# Patient Record
(demographics unavailable — no encounter records)

---

## 2025-05-05 NOTE — HISTORY OF PRESENT ILLNESS
[de-identified] : Ms. Renee Baxter is a very pleasant 53-year-old, Malay speaking female with  utilized for translation, with a PMHx of HTN, hx of chronic lower back pain x 4 years, hx of chronic neck pain with upper extremity radiculopathy s/p C3-C6 ACDF with Dr. Smith ([facility]) on 9/21/23 who presents today for a follow up visit, recommended by her pain management provider after obtaining recent MRI Cervical Spine Non Con at [facility] for ongoing neck and lower back pain. Since her last visit with us, she reports worsening neck pain/stiffness as well as lower back pain which is worse with movement, and can make it difficult for her to get out of bed some days. She states that some days her pain is worse than others, her most bothersome symptom is neck pain/stiffness. She also experiences numbness and tingling of her bilateral hands and feet, which have been worsening over the last few months. She has a history of pre-diabetes as of 3 months ago and is not currently on any medications for sugar control. She reports diffuse nondermatomal numbness/tingling/ache of her bilateral upper extremities is also evident, denies howard shooting pain down her upper extremities. She denies any howard shooting pain down her lower extremities, but states that she feels a deep ache/tightness of the posterior bilateral thighs and buttock region.  She denies any howard issues with dexterity or fine motor skills, but states that she can sometimes drop objects secondary to bilateral hand numbness and tingling.  She denies any issues with bowel/bladder incontinence or saddle anesthesia. Per chart review, she has had bilateral knee injections and medial branch blocks of C4-C6 over the last 6 months, without significant improvement of her symptoms.  She is pending an upcoming injection with pain management however it is unclear what type of injection she will have.  She is on a regimen of methocarbamol, as well as 2 other medications that she is unsure of the name of.  She states that the medications will sometimes help to alleviate her symptoms.  She has not attended any physical therapy for her symptoms.

## 2025-05-05 NOTE — PHYSICAL EXAM
[de-identified] : Patient is awake and alert. Became tearful during discussion describing her pain. Patient is interactive and appropriate RUE 5/5 Deltoid/Biceps/Triceps/WE/WF//IO LUE 5/5 Deltoid/Biceps/Triceps/WE/WF//IO RLE 4/5 HF, 4+ to 5 KE/KF/DF/PF secondary to pain  LLE 4+ to 5 /5 HF/KE/KF/DF/PF secondary to pain  Negative straight leg raise bilaterally although limited exam secondary to tightness of bilateral hamstrings/inability to extend the leg greater than 30 degrees for exam Diffuse tenderness of the midline/paraspinal cervical, thoracic and lumbar spine  Tenderness of right clavicle, no edema or erythema  Sensation intact to light touch Negative Rodriguez's bilaterally Negative clonus bilaterally Narrow-based gait within normal limits, although stiff and shuffling secondary to back pain  reflexes 2+ [de-identified] : MRI Cervical Spine Non Con, 04/02/2025, [facility]: FINDINGS: Status post anterior discectomy and fusion across C3 through C6. Vertebral body heights: Maintained. Alignment: Straightening of the normal cervical lordosis Marrow signal: Preserved. Spinal cord: Normal in signal and caliber. No abnormal enhancement Visualized posterior fossa: Unremarkable. Prevertebral soft tissues: Unremarkable. C2-3: There is right facet hypertrophy without central canal stenosis or foraminal narrowing C3-4: Status post anterior discectomy and fusion without central canal stenosis or foraminal narrowing C4-5: Status post anterior discectomy and fusion without central canal stenosis. There is uncovertebral arthropathy resulting in mild bilateral foraminal narrowing C5-6: Status post anterior discectomy and fusion with posterior osteophytic ridging contacting the right ventral cord resulting in mild central canal stenosis. Uncovertebral arthropathy results in mild left foraminal narrowing C6-7: There is a small left paracentral disc herniation and facet arthropathy resulting in mild left foraminal narrowing C7-T1: There is no significant posterior disc abnormality, spinal canal or foraminal stenosis.  IMPRESSION: 1. Status post anterior discectomy and fusion across C3-C6. 2. Mild central canal stenosis at C5-6 with mild left foraminal narrowing. 3. Mild bilateral C4-5 foraminal narrowing. 4. Mild left C6-7 foraminal narrowing.     MRI Lumbar Spine ([facility], 4/18/24): FINDINGS: There is slight grade 1 anterolisthesis of L4 on L5. Lumbar vertebral body heights are maintained. There is no acute compression fracture. There are small hemangiomas within L2 and L4 vertebral bodies. There is multilevel disc dehydration. There is mild to moderate disc height loss at L1-L2 level. Conus is unremarkable. Paraspinal soft tissues are within normal limits. L1-2: There is diffuse disc bulge impressing upon the ventral thecal sac. There is bilateral facet arthropathy. There is minimal spinal canal stenosis. Neural foramens are patent. L2-3: There is no disc bulge, herniation, thecal sac compression or foraminal narrowing. There is bilateral facet arthropathy. L3-4: There is disc bulge impressing upon the thecal sac. There is bilateral facet arthropathy. There is mild right foraminal narrowing. No spinal canal or left foraminal stenosis. L4-5: There is anterolisthesis with uncovering disc material. There is left foraminal annular fissure. There is severe bilateral facet arthropathy. There is mild spinal canal and mild bilateral foraminal narrowing. L5-S1: There is central disc herniation with annular fissure impressing upon the ventral epidural fat. No thecal sac compression. There is bilateral facet arthropathy. Neural foramens are patent.  IMPRESSION: Slight grade 1 anterolisthesis of L4 on L5. Multilevel degenerative spondylosis as detailed above.

## 2025-05-05 NOTE — PHYSICAL EXAM
[de-identified] : Patient is awake and alert. Became tearful during discussion describing her pain. Patient is interactive and appropriate RUE 5/5 Deltoid/Biceps/Triceps/WE/WF//IO LUE 5/5 Deltoid/Biceps/Triceps/WE/WF//IO RLE 4/5 HF, 4+ to 5 KE/KF/DF/PF secondary to pain  LLE 4+ to 5 /5 HF/KE/KF/DF/PF secondary to pain  Negative straight leg raise bilaterally although limited exam secondary to tightness of bilateral hamstrings/inability to extend the leg greater than 30 degrees for exam Diffuse tenderness of the midline/paraspinal cervical, thoracic and lumbar spine  Tenderness of right clavicle, no edema or erythema  Sensation intact to light touch Negative Rodriguez's bilaterally Negative clonus bilaterally Narrow-based gait within normal limits, although stiff and shuffling secondary to back pain  reflexes 2+ [de-identified] : MRI Cervical Spine Non Con, 04/02/2025, [facility]: FINDINGS: Status post anterior discectomy and fusion across C3 through C6. Vertebral body heights: Maintained. Alignment: Straightening of the normal cervical lordosis Marrow signal: Preserved. Spinal cord: Normal in signal and caliber. No abnormal enhancement Visualized posterior fossa: Unremarkable. Prevertebral soft tissues: Unremarkable. C2-3: There is right facet hypertrophy without central canal stenosis or foraminal narrowing C3-4: Status post anterior discectomy and fusion without central canal stenosis or foraminal narrowing C4-5: Status post anterior discectomy and fusion without central canal stenosis. There is uncovertebral arthropathy resulting in mild bilateral foraminal narrowing C5-6: Status post anterior discectomy and fusion with posterior osteophytic ridging contacting the right ventral cord resulting in mild central canal stenosis. Uncovertebral arthropathy results in mild left foraminal narrowing C6-7: There is a small left paracentral disc herniation and facet arthropathy resulting in mild left foraminal narrowing C7-T1: There is no significant posterior disc abnormality, spinal canal or foraminal stenosis.  IMPRESSION: 1. Status post anterior discectomy and fusion across C3-C6. 2. Mild central canal stenosis at C5-6 with mild left foraminal narrowing. 3. Mild bilateral C4-5 foraminal narrowing. 4. Mild left C6-7 foraminal narrowing.     MRI Lumbar Spine ([facility], 4/18/24): FINDINGS: There is slight grade 1 anterolisthesis of L4 on L5. Lumbar vertebral body heights are maintained. There is no acute compression fracture. There are small hemangiomas within L2 and L4 vertebral bodies. There is multilevel disc dehydration. There is mild to moderate disc height loss at L1-L2 level. Conus is unremarkable. Paraspinal soft tissues are within normal limits. L1-2: There is diffuse disc bulge impressing upon the ventral thecal sac. There is bilateral facet arthropathy. There is minimal spinal canal stenosis. Neural foramens are patent. L2-3: There is no disc bulge, herniation, thecal sac compression or foraminal narrowing. There is bilateral facet arthropathy. L3-4: There is disc bulge impressing upon the thecal sac. There is bilateral facet arthropathy. There is mild right foraminal narrowing. No spinal canal or left foraminal stenosis. L4-5: There is anterolisthesis with uncovering disc material. There is left foraminal annular fissure. There is severe bilateral facet arthropathy. There is mild spinal canal and mild bilateral foraminal narrowing. L5-S1: There is central disc herniation with annular fissure impressing upon the ventral epidural fat. No thecal sac compression. There is bilateral facet arthropathy. Neural foramens are patent.  IMPRESSION: Slight grade 1 anterolisthesis of L4 on L5. Multilevel degenerative spondylosis as detailed above.

## 2025-05-05 NOTE — REASON FOR VISIT
[Follow-Up Visit] : a follow-up visit for [Back Pain] : back pain [Neck Pain] : neck pain [[facility] ] : provided by [employer]   [Time Spent: ____ minutes] : Total time spent using  services: [unfilled] minutes. The patient's primary language is not English thus required  services. [Interpreters_IDNumber] : [id number] [Interpreters_FullName] : Blake

## 2025-05-05 NOTE — ASSESSMENT
[FreeTextEntry1] : Ms. Renee Baxter is a very pleasant 53-year-old, Ukrainian speaking female with  utilized for translation, with a PMHx of HTN, hx of chronic lower back pain x 4 years, hx of chronic neck pain with upper extremity radiculopathy s/p C3-C6 ACDF with Dr. Smith ([facility]) on 9/21/23 who presents today for a follow up visit, recommended by her pain management provider after obtaining recent MRI Cervical Spine Non Con at [facility] for ongoing neck and lower back pain. Over the last few months, she reports worsening neck and lower back pain as well as numbness and tingling of her bilateral hands and feet. She has unfortunately not yet been able to attend any physical therapy for her symptoms and has undergone pain management injections (bilateral knee and medial branch blocks of the cervical spine) without significant improvement of her symptoms.  On exam she is neurologically intact with 5 out of 5 strength of bilateral upper extremities, she is 4+ to 5 of bilateral lower extremities secondary to lower back pain/tightness, diffuse tenderness of the cervical thoracic and lumbar spine both midline and paraspinally, with a negative straight leg raise bilaterally however exam is limited secondary to her inability to extend her leg given her significant lower extremity tightness.   Her most recent MRI of the cervical spine without contrast obtained on April 2, 2025 at [facility] revealed multilevel degenerative changes, with evidence of a C3-C6 anterior discectomy and fusion, without any significant/severe neuroforaminal or central canal stenosis that could be contributing to her symptoms.  We discussed that neck and back pain is multifactorial in nature with a large musculoskeletal/muscle spasm component, and we recommended that she undergo physical therapy for strengthening, stretching, range of motion exercises, massage therapy and stim for relief.  We also recommended she obtain an x-ray of the cervical and lumbar spine w/ flexion and extension to evaluate for any evidence of dynamic instability/abnormal movement given her history of a cervical fusion as well as lumbar L4-5 anterolisthesis.  We recommended she undergo an EMG of her upper and lower extremities given her diffuse and nondermatomal paresthesias, a referral to PM&R was provided, Dr. Lopez.  We also encouraged she see an orthopedist for her right clavicular pain, an x-ray was ordered of her right clavicle and a referral was placed to orthopedics.   We will follow-up in approximately 4 to 6 weeks to discuss her overall progress and review her imaging studies, sooner if any new or worsening symptoms develop.  All questions were answered, she knows to call the office with any questions or concerns.    Plan: - XRay Cervical Spine Flex Ex, XRay Lumbar Spine Flex Ex - XRay right clavicle, Referral to orthopedics for right clavicular pain - Referral placed to PM&R for EMG of bilateral upper extremities, Dr. Lopez - Referral placed to physical therapy for neck and back pain - Follow up in 4-6 weeks to discuss overall progress, review imaging studies

## 2025-05-05 NOTE — HISTORY OF PRESENT ILLNESS
[de-identified] : Ms. Renee Baxter is a very pleasant 53-year-old, Faroese speaking female with  utilized for translation, with a PMHx of HTN, hx of chronic lower back pain x 4 years, hx of chronic neck pain with upper extremity radiculopathy s/p C3-C6 ACDF with Dr. Smith ([facility]) on 9/21/23 who presents today for a follow up visit, recommended by her pain management provider after obtaining recent MRI Cervical Spine Non Con at [facility] for ongoing neck and lower back pain. Since her last visit with us, she reports worsening neck pain/stiffness as well as lower back pain which is worse with movement, and can make it difficult for her to get out of bed some days. She states that some days her pain is worse than others, her most bothersome symptom is neck pain/stiffness. She also experiences numbness and tingling of her bilateral hands and feet, which have been worsening over the last few months. She has a history of pre-diabetes as of 3 months ago and is not currently on any medications for sugar control. She reports diffuse nondermatomal numbness/tingling/ache of her bilateral upper extremities is also evident, denies howard shooting pain down her upper extremities. She denies any howard shooting pain down her lower extremities, but states that she feels a deep ache/tightness of the posterior bilateral thighs and buttock region.  She denies any howard issues with dexterity or fine motor skills, but states that she can sometimes drop objects secondary to bilateral hand numbness and tingling.  She denies any issues with bowel/bladder incontinence or saddle anesthesia. Per chart review, she has had bilateral knee injections and medial branch blocks of C4-C6 over the last 6 months, without significant improvement of her symptoms.  She is pending an upcoming injection with pain management however it is unclear what type of injection she will have.  She is on a regimen of methocarbamol, as well as 2 other medications that she is unsure of the name of.  She states that the medications will sometimes help to alleviate her symptoms.  She has not attended any physical therapy for her symptoms.

## 2025-05-05 NOTE — REASON FOR VISIT
[Follow-Up Visit] : a follow-up visit for [Back Pain] : back pain [Neck Pain] : neck pain [Language [facility] ] : provided by [employer]   [Time Spent: ____ minutes] : Total time spent using  services: [unfilled] minutes. The patient's primary language is not English thus required  services. [Interpreters_IDNumber] : [id number] [Interpreters_FullName] : Blake

## 2025-05-05 NOTE — ASSESSMENT
[FreeTextEntry1] : Ms. Renee Baxter is a very pleasant 53-year-old, Malagasy speaking female with  utilized for translation, with a PMHx of HTN, hx of chronic lower back pain x 4 years, hx of chronic neck pain with upper extremity radiculopathy s/p C3-C6 ACDF with Dr. Smith ([facility]) on 9/21/23 who presents today for a follow up visit, recommended by her pain management provider after obtaining recent MRI Cervical Spine Non Con at [facility] for ongoing neck and lower back pain. Over the last few months, she reports worsening neck and lower back pain as well as numbness and tingling of her bilateral hands and feet. She has unfortunately not yet been able to attend any physical therapy for her symptoms and has undergone pain management injections (bilateral knee and medial branch blocks of the cervical spine) without significant improvement of her symptoms.  On exam she is neurologically intact with 5 out of 5 strength of bilateral upper extremities, she is 4+ to 5 of bilateral lower extremities secondary to lower back pain/tightness, diffuse tenderness of the cervical thoracic and lumbar spine both midline and paraspinally, with a negative straight leg raise bilaterally however exam is limited secondary to her inability to extend her leg given her significant lower extremity tightness.   Her most recent MRI of the cervical spine without contrast obtained on April 2, 2025 at [facility] revealed multilevel degenerative changes, with evidence of a C3-C6 anterior discectomy and fusion, without any significant/severe neuroforaminal or central canal stenosis that could be contributing to her symptoms.  We discussed that neck and back pain is multifactorial in nature with a large musculoskeletal/muscle spasm component, and we recommended that she undergo physical therapy for strengthening, stretching, range of motion exercises, massage therapy and stim for relief.  We also recommended she obtain an x-ray of the cervical and lumbar spine w/ flexion and extension to evaluate for any evidence of dynamic instability/abnormal movement given her history of a cervical fusion as well as lumbar L4-5 anterolisthesis.  We recommended she undergo an EMG of her upper and lower extremities given her diffuse and nondermatomal paresthesias, a referral to PM&R was provided, Dr. Lopez.  We also encouraged she see an orthopedist for her right clavicular pain, an x-ray was ordered of her right clavicle and a referral was placed to orthopedics.   We will follow-up in approximately 4 to 6 weeks to discuss her overall progress and review her imaging studies, sooner if any new or worsening symptoms develop.  All questions were answered, she knows to call the office with any questions or concerns.    Plan: - XRay Cervical Spine Flex Ex, XRay Lumbar Spine Flex Ex - XRay right clavicle, Referral to orthopedics for right clavicular pain - Referral placed to PM&R for EMG of bilateral upper extremities, Dr. Lopez - Referral placed to physical therapy for neck and back pain - Follow up in 4-6 weeks to discuss overall progress, review imaging studies
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
## 2025-05-13 NOTE — PHYSICAL EXAM
[de-identified] : Cervical and Lumbar Exam  CONSTITUTIONAL:  Patient is a very pleasant individual who is well-nourished and appears stated age.  PSYCHIATRIC:  Alert and oriented times three and in no apparent distress, and participates with orthopedic evaluation well. HEAD:  Atraumatic and  nonsyndromic in appearance. EENT: No thyromegaly, EOMI. RESPIRATORY:  Respiratory rate is regular, not dyspneic on examination. LYMPHATICS:  There is no cervical or axillary lymphadenopathy. There is no inguinal lymphadenopathy INTEGUMENTARY:  Skin is clean, dry, and intact about the bilateral upper/lower extremities and cervical/lumbar spine.  VASCULAR:   There is brisk capillary refill about the bilateral upper/lower extremities and radial pulses are 2/4.  NEUROLOGIC:  Negative L'hirmitte, negative Spurling's sign. There are no pathologic reflexes. There is no decrease in sensation of the bilateral upper / lower extremities on Wartenberg pinwheel/manual examination.  Deep tendon reflexes are well-maintained at +2/4 of the bilateral upper / lower extremities and are symmetric.  Negative Tinel's, negative Phalen's bilaterally. MUSCULOSKELETAL:  There is no visible muscular atrophy.  Manual motor strength is well maintained in the bilateral upper and lower extremities.  Cervical and lumbar range of motion is well maintained.  The patient ambulates in a non-myelopathic manner. Normal secondary orthopaedic exam of bilateral shoulders, elbows and hands.  Elbow flexion and extension, wrist extension, finger flexion and abduction are well maintained. Negative tension sign and straight leg raise bilaterally. Quad extension, ankle dorsiflexion, EHL, plantar flexion, and ankle eversion are well preserved. Normal secondary orthopaedic exam of bilateral hips, greater trochanteric area, knees and ankles.  Significant tenderness of the cervical thoracic and lumbar spine with light palpation.  Bilateral tenderness over the posterior and bicipital grooves of the shoulders.  Right positive speeds, positive decant, positive Sargent.  Mild tenderness at the sternoclavicular joint on the right-hand side. [de-identified] : X-ray of the cervical spine obtained on 5/5/2025 at [facility] and reviewed in the office today demonstrates patient status post C3-C4, C4-C5, C5-C6 anterior spinal fusion without any evidence of hardware loosening or failure.  No significant adjacent segment disease noted.  No acute osseous abnormalities noted.  X-rays of the lumbar spine obtained 5/5/2025 at [facility] radiology and reviewed in the office today demonstrates patient with well-maintained lumbar lordosis.  There is lumbar spondylosis noted worse at L5-S1 with anterolisthesis L4-5 which is dynamic changes with flexion-extension views.  Pedicles are well-visualized.  No areas of any acute osseous abnormalities.  X-rays of the right clavicle obtained 5/5/2025 at [facility] procedure radiology and reviewed in the office today demonstrates no acute osseous normalities.  Mild AC joint OA  MRI of the cervical spine obtained 4/2/25 at [facility] and reviewed in the office today demonstrates patient status post C3-C4, C4-C5, C5-6 anterior spinal fusion with no evidence of any hardware failure or loosening.  Very mild adjacent segment disease noted.  No cord signal changes noted.  No critical stenosis noted throughout.  X-rays of the right shoulder obtained 8/31/2024 at [facility] and reviewed in the office today demonstrates mild AC joint OA.  Glenohumeral joint well-preserved no evidence of any dislocations or acute osseous abnormalities.  Previous MRI dated 4/18/2024 as noted procedure radiology reviewed in the office today demonstrates well-maintained lumbar lordosis.  There is mild lumbar spondylosis noted worse at L1-L2 without any areas of critical stenosis.
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
## 2025-05-13 NOTE — REASON FOR VISIT
[Initial Visit] : an initial visit for [Neck Pain] : neck pain [Interpreters_IDNumber] : [id number] [Interpreters_FullName] : Sherwin [[id number]] : [place]